# Patient Record
Sex: MALE | Race: OTHER | HISPANIC OR LATINO | URBAN - METROPOLITAN AREA
[De-identification: names, ages, dates, MRNs, and addresses within clinical notes are randomized per-mention and may not be internally consistent; named-entity substitution may affect disease eponyms.]

---

## 2023-02-27 ENCOUNTER — EMERGENCY (EMERGENCY)
Facility: HOSPITAL | Age: 73
LOS: 1 days | Discharge: HOME | End: 2023-02-27
Attending: EMERGENCY MEDICINE
Payer: MEDICAID

## 2023-02-27 VITALS
DIASTOLIC BLOOD PRESSURE: 72 MMHG | TEMPERATURE: 98 F | RESPIRATION RATE: 20 BRPM | SYSTOLIC BLOOD PRESSURE: 123 MMHG | HEART RATE: 58 BPM | OXYGEN SATURATION: 97 %

## 2023-02-27 VITALS
DIASTOLIC BLOOD PRESSURE: 76 MMHG | SYSTOLIC BLOOD PRESSURE: 137 MMHG | OXYGEN SATURATION: 97 % | WEIGHT: 145.06 LBS | RESPIRATION RATE: 20 BRPM | TEMPERATURE: 98 F | HEART RATE: 69 BPM

## 2023-02-27 LAB
ALBUMIN SERPL ELPH-MCNC: 2.8 G/DL — LOW (ref 3.5–5)
ALP SERPL-CCNC: 95 U/L — SIGNIFICANT CHANGE UP (ref 40–120)
ALT FLD-CCNC: 40 U/L DA — SIGNIFICANT CHANGE UP (ref 10–60)
ANION GAP SERPL CALC-SCNC: 3 MMOL/L — LOW (ref 5–17)
APPEARANCE UR: CLEAR — SIGNIFICANT CHANGE UP
APTT BLD: 32.2 SEC — SIGNIFICANT CHANGE UP (ref 27.5–35.5)
AST SERPL-CCNC: 35 U/L — SIGNIFICANT CHANGE UP (ref 10–40)
BACTERIA # UR AUTO: ABNORMAL /HPF
BASOPHILS # BLD AUTO: 0.03 K/UL — SIGNIFICANT CHANGE UP (ref 0–0.2)
BASOPHILS NFR BLD AUTO: 0.3 % — SIGNIFICANT CHANGE UP (ref 0–2)
BILIRUB SERPL-MCNC: 0.4 MG/DL — SIGNIFICANT CHANGE UP (ref 0.2–1.2)
BILIRUB UR-MCNC: NEGATIVE — SIGNIFICANT CHANGE UP
BUN SERPL-MCNC: 18 MG/DL — SIGNIFICANT CHANGE UP (ref 7–18)
CALCIUM SERPL-MCNC: 9.1 MG/DL — SIGNIFICANT CHANGE UP (ref 8.4–10.5)
CHLORIDE SERPL-SCNC: 106 MMOL/L — SIGNIFICANT CHANGE UP (ref 96–108)
CO2 SERPL-SCNC: 26 MMOL/L — SIGNIFICANT CHANGE UP (ref 22–31)
COLOR SPEC: YELLOW — SIGNIFICANT CHANGE UP
CREAT SERPL-MCNC: 0.7 MG/DL — SIGNIFICANT CHANGE UP (ref 0.5–1.3)
DIFF PNL FLD: NEGATIVE — SIGNIFICANT CHANGE UP
EGFR: 99 ML/MIN/1.73M2 — SIGNIFICANT CHANGE UP
EOSINOPHIL # BLD AUTO: 0.2 K/UL — SIGNIFICANT CHANGE UP (ref 0–0.5)
EOSINOPHIL NFR BLD AUTO: 2.3 % — SIGNIFICANT CHANGE UP (ref 0–6)
EPI CELLS # UR: ABNORMAL /HPF
FLUAV AG NPH QL: SIGNIFICANT CHANGE UP
FLUBV AG NPH QL: SIGNIFICANT CHANGE UP
GLUCOSE SERPL-MCNC: 89 MG/DL — SIGNIFICANT CHANGE UP (ref 70–99)
GLUCOSE UR QL: NEGATIVE — SIGNIFICANT CHANGE UP
HCT VFR BLD CALC: 41.9 % — SIGNIFICANT CHANGE UP (ref 39–50)
HGB BLD-MCNC: 14.1 G/DL — SIGNIFICANT CHANGE UP (ref 13–17)
IMM GRANULOCYTES NFR BLD AUTO: 1.7 % — HIGH (ref 0–0.9)
INR BLD: 1.02 RATIO — SIGNIFICANT CHANGE UP (ref 0.88–1.16)
KETONES UR-MCNC: NEGATIVE — SIGNIFICANT CHANGE UP
LACTATE SERPL-SCNC: 1.3 MMOL/L — SIGNIFICANT CHANGE UP (ref 0.7–2)
LEUKOCYTE ESTERASE UR-ACNC: NEGATIVE — SIGNIFICANT CHANGE UP
LYMPHOCYTES # BLD AUTO: 2.04 K/UL — SIGNIFICANT CHANGE UP (ref 1–3.3)
LYMPHOCYTES # BLD AUTO: 23.3 % — SIGNIFICANT CHANGE UP (ref 13–44)
MCHC RBC-ENTMCNC: 30.8 PG — SIGNIFICANT CHANGE UP (ref 27–34)
MCHC RBC-ENTMCNC: 33.7 GM/DL — SIGNIFICANT CHANGE UP (ref 32–36)
MCV RBC AUTO: 91.5 FL — SIGNIFICANT CHANGE UP (ref 80–100)
MONOCYTES # BLD AUTO: 0.72 K/UL — SIGNIFICANT CHANGE UP (ref 0–0.9)
MONOCYTES NFR BLD AUTO: 8.2 % — SIGNIFICANT CHANGE UP (ref 2–14)
NEUTROPHILS # BLD AUTO: 5.63 K/UL — SIGNIFICANT CHANGE UP (ref 1.8–7.4)
NEUTROPHILS NFR BLD AUTO: 64.2 % — SIGNIFICANT CHANGE UP (ref 43–77)
NITRITE UR-MCNC: NEGATIVE — SIGNIFICANT CHANGE UP
NRBC # BLD: 0 /100 WBCS — SIGNIFICANT CHANGE UP (ref 0–0)
PH UR: 6 — SIGNIFICANT CHANGE UP (ref 5–8)
PLATELET # BLD AUTO: 260 K/UL — SIGNIFICANT CHANGE UP (ref 150–400)
POTASSIUM SERPL-MCNC: 3.9 MMOL/L — SIGNIFICANT CHANGE UP (ref 3.5–5.3)
POTASSIUM SERPL-SCNC: 3.9 MMOL/L — SIGNIFICANT CHANGE UP (ref 3.5–5.3)
PROT SERPL-MCNC: 7.4 G/DL — SIGNIFICANT CHANGE UP (ref 6–8.3)
PROT UR-MCNC: 15
PROTHROM AB SERPL-ACNC: 12.2 SEC — SIGNIFICANT CHANGE UP (ref 10.5–13.4)
RBC # BLD: 4.58 M/UL — SIGNIFICANT CHANGE UP (ref 4.2–5.8)
RBC # FLD: 12.5 % — SIGNIFICANT CHANGE UP (ref 10.3–14.5)
RBC CASTS # UR COMP ASSIST: NEGATIVE /HPF — SIGNIFICANT CHANGE UP (ref 0–2)
SARS-COV-2 RNA SPEC QL NAA+PROBE: SIGNIFICANT CHANGE UP
SODIUM SERPL-SCNC: 135 MMOL/L — SIGNIFICANT CHANGE UP (ref 135–145)
SP GR SPEC: 1.02 — SIGNIFICANT CHANGE UP (ref 1.01–1.02)
UROBILINOGEN FLD QL: NEGATIVE — SIGNIFICANT CHANGE UP
WBC # BLD: 8.77 K/UL — SIGNIFICANT CHANGE UP (ref 3.8–10.5)
WBC # FLD AUTO: 8.77 K/UL — SIGNIFICANT CHANGE UP (ref 3.8–10.5)
WBC UR QL: SIGNIFICANT CHANGE UP /HPF (ref 0–5)

## 2023-02-27 PROCEDURE — 85610 PROTHROMBIN TIME: CPT

## 2023-02-27 PROCEDURE — 85730 THROMBOPLASTIN TIME PARTIAL: CPT

## 2023-02-27 PROCEDURE — 83605 ASSAY OF LACTIC ACID: CPT

## 2023-02-27 PROCEDURE — 87086 URINE CULTURE/COLONY COUNT: CPT

## 2023-02-27 PROCEDURE — 99284 EMERGENCY DEPT VISIT MOD MDM: CPT

## 2023-02-27 PROCEDURE — 85025 COMPLETE CBC W/AUTO DIFF WBC: CPT

## 2023-02-27 PROCEDURE — 87637 SARSCOV2&INF A&B&RSV AMP PRB: CPT

## 2023-02-27 PROCEDURE — 81001 URINALYSIS AUTO W/SCOPE: CPT

## 2023-02-27 PROCEDURE — 99285 EMERGENCY DEPT VISIT HI MDM: CPT | Mod: 25

## 2023-02-27 PROCEDURE — 71045 X-RAY EXAM CHEST 1 VIEW: CPT

## 2023-02-27 PROCEDURE — 80053 COMPREHEN METABOLIC PANEL: CPT

## 2023-02-27 PROCEDURE — 36415 COLL VENOUS BLD VENIPUNCTURE: CPT

## 2023-02-27 PROCEDURE — 87040 BLOOD CULTURE FOR BACTERIA: CPT

## 2023-02-27 PROCEDURE — 71045 X-RAY EXAM CHEST 1 VIEW: CPT | Mod: 26

## 2023-02-27 RX ORDER — AZITHROMYCIN 500 MG/1
500 TABLET, FILM COATED ORAL ONCE
Refills: 0 | Status: COMPLETED | OUTPATIENT
Start: 2023-02-27 | End: 2023-02-27

## 2023-02-27 RX ORDER — AZITHROMYCIN 500 MG/1
1 TABLET, FILM COATED ORAL
Qty: 4 | Refills: 0
Start: 2023-02-27 | End: 2023-03-02

## 2023-02-27 RX ADMIN — AZITHROMYCIN 500 MILLIGRAM(S): 500 TABLET, FILM COATED ORAL at 17:48

## 2023-02-27 NOTE — ED ADULT TRIAGE NOTE - CHIEF COMPLAINT QUOTE
cough for one week , pt is coughing up blood for the last tow days , as per toribiother in law , pt had positive TB test one week ago

## 2023-02-27 NOTE — ED PROVIDER NOTE - PATIENT PORTAL LINK FT
You can access the FollowMyHealth Patient Portal offered by  by registering at the following website: [email or web address]. By joining [employer]’s FollowMyHealth portal, you will also be able to view your health information using other applications (apps) compatible with our system.

## 2023-02-27 NOTE — ED PROVIDER NOTE - CLINICAL SUMMARY MEDICAL DECISION MAKING FREE TEXT BOX
72 year old male with cough and nose bleed. Symptoms suggest viral illness. Consideration for pneumonia and active TB (although unlikely). Labs, ekg, xray

## 2023-02-27 NOTE — ED PROVIDER NOTE - ENMT, MLM
Dry blood in the right nare. Airway patent. Mouth with normal mucosa. Throat has no vesicles, no oropharyngeal exudates and uvula is midline.

## 2023-02-27 NOTE — ED ADULT NURSE NOTE - OBJECTIVE STATEMENT
patient presents to ED with c/o cough with phlegm x2 weeks and noted to have blood in tissue when blows his nose. patient presents to ED with c/o cough with phlegm x2 weeks and reports has blood in tissue when blows his nose.

## 2023-02-27 NOTE — ED PROVIDER NOTE - OBJECTIVE STATEMENT
72 year old male with no PMHx is presenting for 1 week of fevers, chills, cough. Patient states that the symptoms started after getting soaked in the rain. He reports having intermittent right sided nose bleed since yesterday. He was tested positive PPD 2 weeks ago and was informed that he needed to take 6 months of meds. No night sweats, weight loss, or coughing of blood. No chest pain or shortness of breath.

## 2023-02-27 NOTE — ED PROVIDER NOTE - NSFOLLOWUPINSTRUCTIONS_ED_ALL_ED_FT
Bronquitis aguda en los adultos    Acute Bronchitis, Adult    A comparison between normal and swollen airways, or bronchi, in the lungs.   La bronquitis aguda es la inflamación repentina de las vías respiratorias principales (bronquios) que salen de la tráquea en los pulmones. La hinchazón hace que las vías respiratorias se reduzcan y produzcan más mucosidad de lo normal. [place] puede dificultar la respiración y provocar tos o respiración ruidosa (sibilancia).    La bronquitis aguda puede durar varias semanas. La tos puede durar más tiempo. Las alergias, el asma y la exposición al humo pueden empeorar la afección.      ¿Cuáles son las causas?    Esta afección puede ser causada por microbios y por sustancias que irritan los pulmones, por ejemplo:  •Virus del resfrío y de la gripe. La causa más frecuente de esta afección es el virus que provoca el resfrío común.      •Bacterias. [place] es menos frecuente.    •Aspirar sustancias que irritan los pulmones, por ejemplo:  •Humo de cigarrillos y otros productos de tabaco.      •Polvo y polen.      •Vapores de productos de limpieza domésticos, gases o combustible quemado.      •Contaminación del aire interior o exterior.          ¿Qué incrementa el riesgo?    Los siguientes factores pueden hacer que sea más propenso a desarrollar esta afección:  •Debilitamiento del sistema de defensa del organismo, también denominado sistema inmunitario.      •Cam afección que afecte a los pulmones y la respiración, rosalina el asma.        ¿Cuáles son los signos o síntomas?    Los síntomas frecuentes de esta afección incluyen los siguientes:  •Tos. Puede acompañarse de mucosidad transparente, amarillenta o verdosa procedente de los pulmones (esputo).      •Sibilancias.      •Secreción o congestión nasal.      •Exceso de mucosidad en los pulmones (congestión torácica).      •Falta de aire.      •Carmencita y molestias, incluido dolor de garganta o de pecho.        ¿Cómo se diagnostica?    Esta afección, usualmente, se diagnostica en función de lo siguiente:  •Los síntomas y los antecedentes médicos.      •Un examen físico.      También pueden realizarle otros estudios, incluidas pruebas para descartar otras afecciones, rosalina la neumonía. Estos estudios incluyen:  •Cam prueba de función pulmonar.      •Análisis de cam muestra de mucosidad para detectar la presencia de bacterias.      •Pruebas para controlar el nivel de oxígeno en la hung.      •Análisis de hung.      •Radiografía de tórax.        ¿Cómo se trata?    La mayoría de los casos de bronquitis aguda se recupera con el tiempo, sin tratamiento. El médico puede recomendarle lo siguiente:  •Beber más líquidos para ayudar a diluir la mucosidad de modo que sea más fácil expectorarla.      •Usar medicamentos inhalados (inhalador) para mejorar el flujo de aire que entra y sale de los pulmones.      •Usar un vaporizador o un humificador. Se trata de aparatos que aportan humedad al ambiente para ayudar a respirar mejor.      •Tameka un medicamento que diluye la mucosidad y wilbert la congestión (expectorante).      •Tameka un medicamento que previene o detiene la tos (antitusivo).      No esfrecuente tameka un antibiótico para esta afección.      Siga estas indicaciones en gaxiola casa:  A do not smoke cigarettes sign.   •Use los medicamentos de venta reyes y los recetados solamente rosalina se lo haya indicado el médico.      •Utilice un inhalador, un vaporizador o un humidificador, rosalina se lo haya indicado el médico.      •Los [place] dos cucharaditas (10 ml) de miel a la hora de acostarse para disminuir la tos por la noche.      •Meagan suficiente líquido rosalina para mantener la orina de color amarillo pálido.      • No consuma ningún producto que contenga nicotina o tabaco. Estos productos incluyen cigarrillos, tabaco para mascar y aparatos de vapeo, rosalina los cigarrillos electrónicos. Si necesita ayuda para dejar de consumir estos productos, consulte al médico.      •Descanse mucho.      •Retome lucía actividades normales según lo indicado por el médico. Pregúntele al médico qué actividades son seguras para usted.      •Concurra a todas las visitas de seguimiento. [place] es importante.        ¿Cómo se previene?  Washing hands with soap and water.   Para disminuir el riesgo de volver a sufrir esta afección:  •Lávese las alverto frecuentemente con agua y jabón aditya al menos 20 segundos. Use desinfectante para alverto si no dispone de agua y jabón.      •Evite el contacto con personas que tienen síntomas de resfrío.      •Trate de no llevarse las alverto a la boca, la nariz o los ojos.      •Evite inhalar humo o vapores químicos. La inhalación de humo o vapores químicos hará que gaxiola afección empeore.      •Aplíquese la vacuna antigripal todos los años.        Comuníquese con un médico si:    •Los síntomas no mejoran después de 2 semanas.      •Tiene dificultad para expulsar la mucosidad al toser.      •La tos lo mantiene despierto por la noche.      •Tiene fiebre.        Solicite ayuda de inmediato si:    •Tose con hung.      •Siente dolor en el pecho.      •Tiene falta de aire grave.      •Se desmaya o se siente rosalina si se fuera a desmayar.      •Tiene un dolor de angelina intenso.      •Tiene fiebre o escalofríos que empeoran.      Estos síntomas pueden representar un problema grave que constituye cam emergencia. No espere a clovis si los síntomas desaparecen. Solicite atención médica de inmediato. Comuníquese con el servicio de emergencias de gaxiola localidad (911 en los [place]). No conduzca por lucía propios medios hasta el hospital.       Resumen    •La bronquitis aguda es la inflamación de las vías respiratorias principales (bronquios) que salen de la tráquea en los pulmones. La hinchazón hace que las vías respiratorias se reduzcan y produzcan más mucosidad de lo normal.      •Beber más líquidos puede ayudar a diluir la mucosidad de modo que sea más fácil expectorarla.      •Use los medicamentos de venta reyes y los recetados solamente rosalina se lo haya indicado el médico.      • No consuma ningún producto que contenga nicotina o tabaco. Estos productos incluyen cigarrillos, tabaco para mascar y aparatos de vapeo, rosalina los cigarrillos electrónicos. Si necesita ayuda para dejar de consumir estos productos, consulte al médico.      •Comuníquese con un médico si los síntomas no mejoran después de 2 semanas.      Esta información no tiene rosalina fin reemplazar el consejo del médico. Asegúrese de hacerle al médico cualquier pregunta que tenga.

## 2023-02-28 LAB
CULTURE RESULTS: SIGNIFICANT CHANGE UP
SPECIMEN SOURCE: SIGNIFICANT CHANGE UP

## 2023-03-04 LAB
CULTURE RESULTS: SIGNIFICANT CHANGE UP
CULTURE RESULTS: SIGNIFICANT CHANGE UP
SPECIMEN SOURCE: SIGNIFICANT CHANGE UP
SPECIMEN SOURCE: SIGNIFICANT CHANGE UP

## 2024-08-29 ENCOUNTER — EMERGENCY (EMERGENCY)
Facility: HOSPITAL | Age: 74
LOS: 1 days | Discharge: HOME | End: 2024-08-29
Attending: STUDENT IN AN ORGANIZED HEALTH CARE EDUCATION/TRAINING PROGRAM
Payer: MEDICAID

## 2024-08-29 VITALS
HEIGHT: 64.17 IN | OXYGEN SATURATION: 98 % | RESPIRATION RATE: 20 BRPM | HEART RATE: 50 BPM | TEMPERATURE: 98 F | SYSTOLIC BLOOD PRESSURE: 109 MMHG | DIASTOLIC BLOOD PRESSURE: 70 MMHG | WEIGHT: 138.01 LBS

## 2024-08-29 VITALS
HEART RATE: 69 BPM | OXYGEN SATURATION: 99 % | DIASTOLIC BLOOD PRESSURE: 69 MMHG | TEMPERATURE: 98 F | RESPIRATION RATE: 18 BRPM | SYSTOLIC BLOOD PRESSURE: 137 MMHG

## 2024-08-29 PROBLEM — Z78.9 OTHER SPECIFIED HEALTH STATUS: Chronic | Status: ACTIVE | Noted: 2023-02-27

## 2024-08-29 LAB
ALBUMIN SERPL ELPH-MCNC: 3.4 G/DL — LOW (ref 3.5–5)
ALP SERPL-CCNC: 74 U/L — SIGNIFICANT CHANGE UP (ref 40–120)
ALT FLD-CCNC: 48 U/L DA — SIGNIFICANT CHANGE UP (ref 10–60)
ANION GAP SERPL CALC-SCNC: 4 MMOL/L — LOW (ref 5–17)
APPEARANCE UR: CLEAR — SIGNIFICANT CHANGE UP
AST SERPL-CCNC: 38 U/L — SIGNIFICANT CHANGE UP (ref 10–40)
BASOPHILS # BLD AUTO: 0.02 K/UL — SIGNIFICANT CHANGE UP (ref 0–0.2)
BASOPHILS NFR BLD AUTO: 0.4 % — SIGNIFICANT CHANGE UP (ref 0–2)
BILIRUB SERPL-MCNC: 0.9 MG/DL — SIGNIFICANT CHANGE UP (ref 0.2–1.2)
BILIRUB UR-MCNC: NEGATIVE — SIGNIFICANT CHANGE UP
BUN SERPL-MCNC: 14 MG/DL — SIGNIFICANT CHANGE UP (ref 7–18)
CALCIUM SERPL-MCNC: 9.1 MG/DL — SIGNIFICANT CHANGE UP (ref 8.4–10.5)
CHLORIDE SERPL-SCNC: 110 MMOL/L — HIGH (ref 96–108)
CO2 SERPL-SCNC: 26 MMOL/L — SIGNIFICANT CHANGE UP (ref 22–31)
COLOR SPEC: YELLOW — SIGNIFICANT CHANGE UP
CREAT SERPL-MCNC: 0.73 MG/DL — SIGNIFICANT CHANGE UP (ref 0.5–1.3)
DIFF PNL FLD: NEGATIVE — SIGNIFICANT CHANGE UP
EGFR: 96 ML/MIN/1.73M2 — SIGNIFICANT CHANGE UP
EOSINOPHIL # BLD AUTO: 0.14 K/UL — SIGNIFICANT CHANGE UP (ref 0–0.5)
EOSINOPHIL NFR BLD AUTO: 2.7 % — SIGNIFICANT CHANGE UP (ref 0–6)
GLUCOSE SERPL-MCNC: 95 MG/DL — SIGNIFICANT CHANGE UP (ref 70–99)
GLUCOSE UR QL: NEGATIVE MG/DL — SIGNIFICANT CHANGE UP
HCT VFR BLD CALC: 41.3 % — SIGNIFICANT CHANGE UP (ref 39–50)
HGB BLD-MCNC: 14.7 G/DL — SIGNIFICANT CHANGE UP (ref 13–17)
IMM GRANULOCYTES NFR BLD AUTO: 0.2 % — SIGNIFICANT CHANGE UP (ref 0–0.9)
KETONES UR-MCNC: NEGATIVE MG/DL — SIGNIFICANT CHANGE UP
LEUKOCYTE ESTERASE UR-ACNC: NEGATIVE — SIGNIFICANT CHANGE UP
LYMPHOCYTES # BLD AUTO: 2.11 K/UL — SIGNIFICANT CHANGE UP (ref 1–3.3)
LYMPHOCYTES # BLD AUTO: 40.3 % — SIGNIFICANT CHANGE UP (ref 13–44)
MCHC RBC-ENTMCNC: 32.3 PG — SIGNIFICANT CHANGE UP (ref 27–34)
MCHC RBC-ENTMCNC: 35.6 GM/DL — SIGNIFICANT CHANGE UP (ref 32–36)
MCV RBC AUTO: 90.8 FL — SIGNIFICANT CHANGE UP (ref 80–100)
MONOCYTES # BLD AUTO: 0.57 K/UL — SIGNIFICANT CHANGE UP (ref 0–0.9)
MONOCYTES NFR BLD AUTO: 10.9 % — SIGNIFICANT CHANGE UP (ref 2–14)
NEUTROPHILS # BLD AUTO: 2.38 K/UL — SIGNIFICANT CHANGE UP (ref 1.8–7.4)
NEUTROPHILS NFR BLD AUTO: 45.5 % — SIGNIFICANT CHANGE UP (ref 43–77)
NITRITE UR-MCNC: NEGATIVE — SIGNIFICANT CHANGE UP
NRBC # BLD: 0 /100 WBCS — SIGNIFICANT CHANGE UP (ref 0–0)
PH UR: 7 — SIGNIFICANT CHANGE UP (ref 5–8)
PLATELET # BLD AUTO: 150 K/UL — SIGNIFICANT CHANGE UP (ref 150–400)
POTASSIUM SERPL-MCNC: 3.9 MMOL/L — SIGNIFICANT CHANGE UP (ref 3.5–5.3)
POTASSIUM SERPL-SCNC: 3.9 MMOL/L — SIGNIFICANT CHANGE UP (ref 3.5–5.3)
PROT SERPL-MCNC: 6.9 G/DL — SIGNIFICANT CHANGE UP (ref 6–8.3)
PROT UR-MCNC: NEGATIVE MG/DL — SIGNIFICANT CHANGE UP
RBC # BLD: 4.55 M/UL — SIGNIFICANT CHANGE UP (ref 4.2–5.8)
RBC # FLD: 12.8 % — SIGNIFICANT CHANGE UP (ref 10.3–14.5)
SODIUM SERPL-SCNC: 140 MMOL/L — SIGNIFICANT CHANGE UP (ref 135–145)
SP GR SPEC: 1.01 — SIGNIFICANT CHANGE UP (ref 1–1.03)
UROBILINOGEN FLD QL: 0.2 MG/DL — SIGNIFICANT CHANGE UP (ref 0.2–1)
WBC # BLD: 5.23 K/UL — SIGNIFICANT CHANGE UP (ref 3.8–10.5)
WBC # FLD AUTO: 5.23 K/UL — SIGNIFICANT CHANGE UP (ref 3.8–10.5)

## 2024-08-29 PROCEDURE — 80053 COMPREHEN METABOLIC PANEL: CPT

## 2024-08-29 PROCEDURE — 99284 EMERGENCY DEPT VISIT MOD MDM: CPT | Mod: 25

## 2024-08-29 PROCEDURE — 81003 URINALYSIS AUTO W/O SCOPE: CPT

## 2024-08-29 PROCEDURE — 74177 CT ABD & PELVIS W/CONTRAST: CPT | Mod: MC

## 2024-08-29 PROCEDURE — 99285 EMERGENCY DEPT VISIT HI MDM: CPT

## 2024-08-29 PROCEDURE — 85025 COMPLETE CBC W/AUTO DIFF WBC: CPT

## 2024-08-29 PROCEDURE — 36415 COLL VENOUS BLD VENIPUNCTURE: CPT

## 2024-08-29 PROCEDURE — 76870 US EXAM SCROTUM: CPT | Mod: 26

## 2024-08-29 PROCEDURE — 74177 CT ABD & PELVIS W/CONTRAST: CPT | Mod: 26,MC

## 2024-08-29 PROCEDURE — 87491 CHLMYD TRACH DNA AMP PROBE: CPT

## 2024-08-29 PROCEDURE — 87591 N.GONORRHOEAE DNA AMP PROB: CPT

## 2024-08-29 PROCEDURE — 76870 US EXAM SCROTUM: CPT

## 2024-08-29 NOTE — ED PROVIDER NOTE - PATIENT PORTAL LINK FT
You can access the FollowMyHealth Patient Portal offered by [facility] by registering at the following website: [email or web address]. By joining [employer]’s FollowMyHealth portal, you will also be able to view your health information using other applications (apps) compatible with our system. You can access the FollowMyHealth Patient Portal offered by [facility] by registering at the following website: [email or web address]. By joining [employer]’s FollowMyHealth portal, you will also be able to view your health information using other applications (apps) compatible with our system.

## 2024-08-29 NOTE — ED PROVIDER NOTE - CLINICAL SUMMARY MEDICAL DECISION MAKING FREE TEXT BOX
74y male hx pre-DM presents for swelling in the inguinal region with mild burning sensation and associated dyschezia without rectal bleeding, abdominal pain, vomiting, or penile discharge. contrary to triage note, no testicular pain but question of swelling, more prominant at the mons is swollen L>R. ROS otherwise negative. no prior surgeries. clear cardiopulmonary exam, benign abdomen with screening us for possible ortchitis/epididymitis, labs, ua/urine gc/chlam and tx for likely prostatitis. nonactionable vital signs nonactionable.

## 2024-08-29 NOTE — ED PROVIDER NOTE - CARE PLAN
1 Principal Discharge DX:	Prostatitis   Principal Discharge DX:	Prostatitis  Secondary Diagnosis:	Hernia, inguinal

## 2024-08-29 NOTE — ED PROVIDER NOTE - PHYSICAL EXAMINATION
General: non-toxic, NAD  HEENT: NCAT, PERRL  Cardiac: RRR, no murmurs, 2+ peripheral pulses  Resp: CTAB  Abdomen/: soft, non-distended, bowel sounds present, no ttp, no rebound or guarding. no organomegaly. small, soft, mobile fullness at the level of the inguinal canal bilaterally likely related to lymphadenopathy. no expressed discharge from the penis. no testicular tenderness. bilaterally intact cremaster reflexes. chaperone max lazarus, do  Extremities: no peripheral edema, calf tenderness, or leg size discrepancies  Skin: no rashes  Neuro: AAOx4, 5+motor, sensation grossly intact  Psych: mood and affect appropriate

## 2024-08-29 NOTE — ED PROVIDER NOTE - HIV OFFER
· Follow up with your primary care in 2-5 days if symptoms have not improved, or you may return here.  · If you were referred to a specialist, please follow up with that specialty.  · If you were prescribed antibiotics, please take them to completion.  · If you were prescribed a narcotic or any medication with sedative effects, do not drive or operate heavy equipment or machinery while taking these medications.  · You must understand that you have received treatment at an Urgent Care facility only, and that you may be released before all of your medical problems are known or treated. Urgent Care facilities are not equipped to handle life threatening emergencies. It is recommended that you go to an Emergency Department for further evaluation of worsening or concerning symptoms, or possibly life threatening conditions as discussed.                                        If you  smoke, please stop smoking    Symptomatic treatment:    Alternate tylenol and motrin every 4-6 hours  salt water gargles  Cold-eeze helps to reduce the duration of sore throat symptoms  Cepachol helps to numb the discomfort  Chloroseptic spray  Nasal saline spray reduces inflammation and dryness  Warm face compresses as often as you can  Vicks vapor rub at night  Flonase OTC or Nasacort OTC  Simple foods like chicken noodle soup help  Pedialyte helps with dehydration if lacking appetite  Rest as much as you can      
Opt out

## 2024-08-29 NOTE — ED PROVIDER NOTE - NS ED ROS FT
Constitutional: no fevers, chills  HEENT: no HA, vision changes, rhinorrhea, sore throat  Cardiac: no chest pain, palpitations  Respiratory: no SOB, cough or hemoptysis  GI: no n/v/d, +abd pain, no bloody or dark stools  : no dysuria, frequency, or hematuria  MSK: no joint pain, neck pain or back pain  Skin: no rashes, jaundice, pruritis  Neuro: no numbness/tingling, weakness, unsteady gait  ROS otherwise neg except per MDM

## 2024-08-29 NOTE — ED PROVIDER NOTE - NSFOLLOWUPINSTRUCTIONS_ED_ALL_ED_FT
Le atendieron en Urgencias por prostatitis.     1) Avanzar en la actividad según se tolere.   2) Continúe con todos los medicamentos recetados anteriormente según las indicaciones.    3) Shreya un seguimiento con gaxiola médico de atención primaria en 24 a 48 horas; tome copias de lucía resultados.    4) Regresar al Departamento de Emergencias si los síntomas empeoran o persisten, y/o CUALQUIER SÍNTOMA NUEVO O PREOCUPANTE.    albin todo el ciclo de antibióticos.     shreya un seguimiento con gaxiola médico de atención primaria.     concertar cam anjali con un urólogo. cam clínica cercana se enumera a continuación.    You were seen in the Emergency Department for prostatitis.     1) Advance activity as tolerated.   2) Continue all previously prescribed medications as directed.    3) Follow up with your primary care physician in 24-48 hours - take copies of your results.    4) Return to the Emergency Department for worsening or persistent symptoms, and/or ANY NEW OR CONCERNING SYMPTOMS.    take the entire course of antibiotics.     follow up with your primary care doctor.     make an appointment with a urologist. a nearby clinic is listed below. Le atendieron en Urgencias por prostatitis.     1) Avanzar en la actividad según se tolere.   2) Continúe con todos los medicamentos recetados anteriormente según las indicaciones.    3) Shreya un seguimiento con gaxiola médico de atención primaria en 24 a 48 horas; tome copias de regla resultados.    4) Regresar al Departamento de Emergencias si los síntomas empeoran o persisten, y/o CUALQUIER SÍNTOMA NUEVO O PREOCUPANTE.    albin todo el ciclo de antibióticos.     shreya un seguimiento con gaxiola médico de atención primaria.     concertar cam anjali con un urólogo. cam clínica cercana se enumera a continuación.    You were seen in the Emergency Department for prostatitis.     1) Advance activity as tolerated.   2) Continue all previously prescribed medications as directed.    3) Follow up with your primary care physician in 24-48 hours - take copies of your results.    4) Return to the Emergency Department for worsening or persistent symptoms, and/or ANY NEW OR CONCERNING SYMPTOMS.    take the entire course of antibiotics.     follow up with your primary care doctor.     make an appointment with a urologist. a nearby clinic is listed below.    Hernia inguinal en los adultos  Inguinal Hernia, Adult    Cam hernia inguinal se produce cuando la grasa o los intestinos empujan a través de cam vickie débil de un músculo donde se unen la pierna y el abdomen inferior (zurdo). [place] crea un bulto. Sujata tipo de hernia también puede aparecer en:  El escroto, si es varón.  Los pliegues de la piel alrededor de la vagina, si es espinoza.  Existen tatum tipos de hernias inguinales:  Hernias que se pueden empujar hacia adentro del abdomen (son reducibles). Sujata tipo de hernias cornelius vez provoca dolor.  Hernias que no son reducibles (están encarceladas).  Hernias que no son reducibles y pierden la irrigación sanguínea (están estranguladas). Sujata tipo de hernia requiere cirugía de emergencia.  ¿Cuáles son las causas?  Esta afección se produce cuando tiene cam vickie débil en los músculos o en los tejidos de la zurdo. [place] se desarrolla con el tiempo. La hernia puede salirse por la vickie débil cuando ejerce un esfuerzo repentino sobre los músculos abdominales inferiores, por ejemplo, en los siguientes casos:  Levanta un objeto pesado.  Hace esfuerzos para defecar. El estreñimiento puede llevar a tener que hacer un gran esfuerzo.  Tos.  ¿Qué incrementa el riesgo?  Es más probable que esta afección se manifieste en:  Los hombres.  Las embarazadas.  Personas que:  Tienen sobrepeso.  Realizan trabajos que requieren permanecer estar de pie aditya largos períodos o levantar cargas pesadas.  Landaverde tenido cam hernia inguinal previamente.  Fuman o tienen cam enfermedad pulmonar. Estos factores pueden causar tos a eliecer plazo (crónica).  ¿Cuáles son los signos o síntomas?  Los síntomas pueden depender del tamaño de la hernia. Con frecuencia, cam pequeña hernia inguinal no tiene síntomas. Estos son algunos de los síntomas de cam hernia más devi:  Un bulto en la vickie de la zurdo. Sujata es fácil de clovis cuando se encuentra de pie. Podría no ser visible si se encuentra acostado.  Dolor o ardor en la zurdo. [place] podría empeorar cuando levanta un objeto, realiza un esfuerzo o tose.  Un dolor sordo o cam sensación de presión en la zurdo.  Un bulto inusual en el escroto, en los hombres.  Los síntomas de cam hernia inguinal estrangulada pueden incluir lo siguiente:  Un bulto en la zurdo que es muy doloroso y sensible al tacto.  Un bulto que se torna de color wiseman o púrpura.  Fiebre, náuseas y vómitos.  Imposibilidad de defecar o eliminar gases.  ¿Cómo se diagnostica?  Esta afección se diagnostica en función de regla síntomas, antecedentes médicos y de un examen físico. El médico puede examinar la vickie de la zurdo y pedirle que tosa.    ¿Cómo se trata?  El tratamiento depende del tamaño de la hernia y de si tiene síntomas o no. Si no tiene síntomas, el médico podrá indicarle que controle cuidadosamente la hernia y que concurra a las visitas de seguimiento. Si tiene síntomas o cam hernia devi, es posible que necesite cirugía para repararla.    Siga estas instrucciones en gaxiola casa:  Estilo de marco    Evite levantar objetos pesados.  Intente no estar de pie aditya mucho tiempo.  No consuma ningún producto que contenga nicotina o tabaco. Estos productos incluyen cigarrillos, tabaco para mascar y aparatos de vapeo, rosalina los cigarrillos electrónicos. Si necesita ayuda para dejar de fumar, consulte al médico.  Mantenga un peso saludable.  Prevención del estreñimiento    Es posible que tenga que albin estas medidas para prevenir o tratar el estreñimiento:  Beber suficiente líquido rosalina para mantener la orina de color amarillo pálido.  Usar medicamentos recetados o de venta reyes.  Consumir alimentos ricos en fibra, rosalina frijoles, cereales integrales, y frutas y verduras frescas.  Limitar el consumo de alimentos ricos en grasa y azúcares procesados, rosalina los alimentos fritos o dulces.  Instrucciones generales    Puede intentar colocar la hernia en gaxiola lugar ejerciendo sobre esta cam presión muy suave mientras está acostado. No intente forzar el bulto hacia adentro nuevamente si sujata no entra fácilmente.  Observe si la hernia cambia de forma, de color o de tamaño. Solicite ayuda de inmediato si observa algún cambio.  Use los medicamentos de venta reyes y los recetados solamente rosalina se lo haya indicado el médico.  Cumpla con todas las visitas de seguimiento. [place] es importante.  Comuníquese con un médico si:  Tiene fiebre o escalofríos.  Tiene nuevos síntomas.  Regla síntomas empeoran.  Solicite ayuda de inmediato si:  Tiene dolor en la zurdo que comienza repentinamente y empeora.  Tiene un bulto en la zurdo que tiene las siguientes características:  Se agranda de repente y no se encoge.  Se vuelve wiseman o púrpura y causa dolor al tacto.  Si es hombre y tiene un dolor repentino en el escroto o el tamaño de sujata cambia de repente.  No puede volver a colocar la hernia en gaxiola lugar al ejercer sobre esta cam presión muy suave mientras está acostado.  Tiene náuseas o vómitos que no desaparecen.  Tiene latidos cardíacos acelerados.  Tiene dificultad para defecar o eliminar gases.  Estos síntomas pueden representar un problema grave que constituye cam emergencia. No espere a clovis si los síntomas desaparecen. Solicite atención médica de inmediato. Comuníquese con el servicio de emergencias de gaxiola localidad (911 en los [place]).    Resumen  Cam hernia inguinal se produce cuando la grasa o los intestinos empujan a través de cam vickie débil de un músculo donde se unen la pierna y el abdomen inferior (zurdo).  Esta afección ocurre cuando tiene cam vickie débil en los músculos o en los tejidos de la zurdo.  Los síntomas pueden depender del tamaño de la hernia, y pueden incluir dolor o hinchazón de la zurdo. Con frecuencia, cam pequeña hernia inguinal no tiene síntomas.  Si no tiene síntomas, es posible que no necesite tratamiento. Si tiene síntomas o cam hernia devi, es posible que necesite cirugía para reparar la hernia.  Evite levantar objetos pesados. También intente no estar de pie aditya mucho tiempo.  Esta información no tiene rosalina fin reemplazar el consejo del médico. Asegúrese de hacerle al médico cualquier pregunta que tenga.    Document Revised: 10/18/2021 Document Reviewed: 09/03/2021  [employer] Patient Education © 2024 [employer]  [employer] logo  Terms and Conditions  Privacy Policy  Editorial Policy  All content on this site: Copyright © 2024 [employer], its licensors, and contributors. All rights are reserved, including those for text and data mining, AI training, and similar technologies. For all open access content, the Creative Commons licensing terms apply.  Cookies are used by this site. To decline or learn more, visit our Cookies p

## 2024-08-30 LAB
C TRACH RRNA SPEC QL NAA+PROBE: SIGNIFICANT CHANGE UP
N GONORRHOEA RRNA SPEC QL NAA+PROBE: SIGNIFICANT CHANGE UP

## 2024-09-09 ENCOUNTER — EMERGENCY (EMERGENCY)
Facility: HOSPITAL | Age: 74
LOS: 1 days | Discharge: HOME | End: 2024-09-09
Attending: EMERGENCY MEDICINE
Payer: MEDICAID

## 2024-09-09 VITALS
HEART RATE: 50 BPM | SYSTOLIC BLOOD PRESSURE: 133 MMHG | RESPIRATION RATE: 17 BRPM | OXYGEN SATURATION: 98 % | HEIGHT: 64.17 IN | WEIGHT: 139.99 LBS | TEMPERATURE: 98 F | DIASTOLIC BLOOD PRESSURE: 66 MMHG

## 2024-09-09 VITALS
SYSTOLIC BLOOD PRESSURE: 149 MMHG | RESPIRATION RATE: 18 BRPM | DIASTOLIC BLOOD PRESSURE: 72 MMHG | OXYGEN SATURATION: 100 % | HEART RATE: 58 BPM | TEMPERATURE: 98 F

## 2024-09-09 LAB
ALBUMIN SERPL ELPH-MCNC: 3.8 G/DL — SIGNIFICANT CHANGE UP (ref 3.5–5)
ALP SERPL-CCNC: 85 U/L — SIGNIFICANT CHANGE UP (ref 40–120)
ALT FLD-CCNC: 63 U/L DA — HIGH (ref 10–60)
ANION GAP SERPL CALC-SCNC: 3 MMOL/L — LOW (ref 5–17)
APPEARANCE UR: CLEAR — SIGNIFICANT CHANGE UP
AST SERPL-CCNC: 45 U/L — HIGH (ref 10–40)
BASOPHILS # BLD AUTO: 0.02 K/UL — SIGNIFICANT CHANGE UP (ref 0–0.2)
BASOPHILS NFR BLD AUTO: 0.4 % — SIGNIFICANT CHANGE UP (ref 0–2)
BILIRUB SERPL-MCNC: 1 MG/DL — SIGNIFICANT CHANGE UP (ref 0.2–1.2)
BILIRUB UR-MCNC: NEGATIVE — SIGNIFICANT CHANGE UP
BUN SERPL-MCNC: 16 MG/DL — SIGNIFICANT CHANGE UP (ref 7–18)
CALCIUM SERPL-MCNC: 9.1 MG/DL — SIGNIFICANT CHANGE UP (ref 8.4–10.5)
CHLORIDE SERPL-SCNC: 109 MMOL/L — HIGH (ref 96–108)
CO2 SERPL-SCNC: 27 MMOL/L — SIGNIFICANT CHANGE UP (ref 22–31)
COLOR SPEC: YELLOW — SIGNIFICANT CHANGE UP
CREAT SERPL-MCNC: 0.74 MG/DL — SIGNIFICANT CHANGE UP (ref 0.5–1.3)
DIFF PNL FLD: NEGATIVE — SIGNIFICANT CHANGE UP
EGFR: 96 ML/MIN/1.73M2 — SIGNIFICANT CHANGE UP
EOSINOPHIL # BLD AUTO: 0.09 K/UL — SIGNIFICANT CHANGE UP (ref 0–0.5)
EOSINOPHIL NFR BLD AUTO: 1.9 % — SIGNIFICANT CHANGE UP (ref 0–6)
GLUCOSE SERPL-MCNC: 99 MG/DL — SIGNIFICANT CHANGE UP (ref 70–99)
GLUCOSE UR QL: NEGATIVE MG/DL — SIGNIFICANT CHANGE UP
HCT VFR BLD CALC: 44.5 % — SIGNIFICANT CHANGE UP (ref 39–50)
HGB BLD-MCNC: 15.7 G/DL — SIGNIFICANT CHANGE UP (ref 13–17)
IMM GRANULOCYTES NFR BLD AUTO: 0.2 % — SIGNIFICANT CHANGE UP (ref 0–0.9)
KETONES UR-MCNC: NEGATIVE MG/DL — SIGNIFICANT CHANGE UP
LACTATE SERPL-SCNC: 1.9 MMOL/L — SIGNIFICANT CHANGE UP (ref 0.7–2)
LEUKOCYTE ESTERASE UR-ACNC: NEGATIVE — SIGNIFICANT CHANGE UP
LIDOCAIN IGE QN: 49 U/L — SIGNIFICANT CHANGE UP (ref 13–75)
LYMPHOCYTES # BLD AUTO: 1.58 K/UL — SIGNIFICANT CHANGE UP (ref 1–3.3)
LYMPHOCYTES # BLD AUTO: 32.8 % — SIGNIFICANT CHANGE UP (ref 13–44)
MCHC RBC-ENTMCNC: 31.8 PG — SIGNIFICANT CHANGE UP (ref 27–34)
MCHC RBC-ENTMCNC: 35.3 GM/DL — SIGNIFICANT CHANGE UP (ref 32–36)
MCV RBC AUTO: 90.3 FL — SIGNIFICANT CHANGE UP (ref 80–100)
MONOCYTES # BLD AUTO: 0.34 K/UL — SIGNIFICANT CHANGE UP (ref 0–0.9)
MONOCYTES NFR BLD AUTO: 7.1 % — SIGNIFICANT CHANGE UP (ref 2–14)
NEUTROPHILS # BLD AUTO: 2.77 K/UL — SIGNIFICANT CHANGE UP (ref 1.8–7.4)
NEUTROPHILS NFR BLD AUTO: 57.6 % — SIGNIFICANT CHANGE UP (ref 43–77)
NITRITE UR-MCNC: NEGATIVE — SIGNIFICANT CHANGE UP
NRBC # BLD: 0 /100 WBCS — SIGNIFICANT CHANGE UP (ref 0–0)
PH UR: 5.5 — SIGNIFICANT CHANGE UP (ref 5–8)
PLATELET # BLD AUTO: 170 K/UL — SIGNIFICANT CHANGE UP (ref 150–400)
POTASSIUM SERPL-MCNC: 3.8 MMOL/L — SIGNIFICANT CHANGE UP (ref 3.5–5.3)
POTASSIUM SERPL-SCNC: 3.8 MMOL/L — SIGNIFICANT CHANGE UP (ref 3.5–5.3)
PROT SERPL-MCNC: 7.2 G/DL — SIGNIFICANT CHANGE UP (ref 6–8.3)
PROT UR-MCNC: NEGATIVE MG/DL — SIGNIFICANT CHANGE UP
RBC # BLD: 4.93 M/UL — SIGNIFICANT CHANGE UP (ref 4.2–5.8)
RBC # FLD: 12.9 % — SIGNIFICANT CHANGE UP (ref 10.3–14.5)
SODIUM SERPL-SCNC: 139 MMOL/L — SIGNIFICANT CHANGE UP (ref 135–145)
SP GR SPEC: 1.01 — SIGNIFICANT CHANGE UP (ref 1–1.03)
UROBILINOGEN FLD QL: 0.2 MG/DL — SIGNIFICANT CHANGE UP (ref 0.2–1)
WBC # BLD: 4.81 K/UL — SIGNIFICANT CHANGE UP (ref 3.8–10.5)
WBC # FLD AUTO: 4.81 K/UL — SIGNIFICANT CHANGE UP (ref 3.8–10.5)

## 2024-09-09 PROCEDURE — 99284 EMERGENCY DEPT VISIT MOD MDM: CPT | Mod: 25

## 2024-09-09 PROCEDURE — 83605 ASSAY OF LACTIC ACID: CPT

## 2024-09-09 PROCEDURE — 74177 CT ABD & PELVIS W/CONTRAST: CPT | Mod: 26,MC

## 2024-09-09 PROCEDURE — 87086 URINE CULTURE/COLONY COUNT: CPT

## 2024-09-09 PROCEDURE — 83690 ASSAY OF LIPASE: CPT

## 2024-09-09 PROCEDURE — 99285 EMERGENCY DEPT VISIT HI MDM: CPT

## 2024-09-09 PROCEDURE — 81003 URINALYSIS AUTO W/O SCOPE: CPT

## 2024-09-09 PROCEDURE — 85025 COMPLETE CBC W/AUTO DIFF WBC: CPT

## 2024-09-09 PROCEDURE — T1013: CPT

## 2024-09-09 PROCEDURE — 80053 COMPREHEN METABOLIC PANEL: CPT

## 2024-09-09 PROCEDURE — 36415 COLL VENOUS BLD VENIPUNCTURE: CPT

## 2024-09-09 PROCEDURE — 74177 CT ABD & PELVIS W/CONTRAST: CPT | Mod: MC

## 2024-09-09 RX ORDER — IBUPROFEN 600 MG
1 TABLET ORAL
Qty: 28 | Refills: 0
Start: 2024-09-09 | End: 2024-09-15

## 2024-09-09 NOTE — CONSULT NOTE ADULT - ASSESSMENT
74y.o. Male with reducible LIH    -Outpt f/u with Dr. Salvador 9/12/24 at 1700  -No acute surgical intervention indicated at present time  -No heavy lifting, straining, exercises  -Diet as tolerated

## 2024-09-09 NOTE — ED PROVIDER NOTE - PROGRESS NOTE DETAILS
[place]: CT with bilateral inguinal hernias and on the left side with nonobstructed small bowel.  Surgery was consulted and evaluated the patient.  As per surgery hernia is reducible and patient is going to follow-up with general surgery this week for evaluation and planning for surgery.  Will discharge with pain medications.  Follow-up with general surgery.  Return precautions discussed.

## 2024-09-09 NOTE — ED PROVIDER NOTE - PHYSICAL EXAMINATION
Gen. No acute distress  HEENT: Pharynx is clear  Lungs: Bilateral breath sounds  CVS: S1S2   Abd; no guarding no distention no tenderness, no CVA tenderness  Ext: No edema no erythema  Neuro: Alert oriented x 3 no focal deficits  MSK: Strength 5 5 bilateral upper and lower extremities

## 2024-09-09 NOTE — ED PROVIDER NOTE - PATIENT PORTAL LINK FT
You can access the FollowMyHealth Patient Portal offered by [facility] by registering at the following website: [email or web address]. By joining [employer]’s FollowMyHealth portal, you will also be able to view your health information using other applications (apps) compatible with our system.

## 2024-09-09 NOTE — ED PROVIDER NOTE - NSFOLLOWUPCLINICS_GEN_ALL_ED_FT
[facility]  Surgery  [address] [place] [address]  Phone: [phone]  Fax: [phone]  Scheduled Appointment: 9/12/2024 5:00 PM

## 2024-09-09 NOTE — CONSULT NOTE ADULT - SUBJECTIVE AND OBJECTIVE BOX
Patient is a 74y old  Male who presents with a chief complaint of     HPI:  Called see and eval 74y.o. Male w/PMH significant for pre-DM, L inguinal hernia for L groin pain. Pt states he has been feeling groin pain for about 8 days. Does not recall what he was doing when pain started, but states he feels the pain intermittently, especially when he voids and defecates. Eating well, +flatus/BM. Pt has appointment with Dr. Salvador for 24 for surgical evaluation.     PAST MEDICAL & SURGICAL HISTORY:  No pertinent past medical history    No significant past surgical history    Allergies    No Known Allergies    Intolerances    Vital Signs Last 24 Hrs  T(C): 36.7 (09 Sep 2024 15:48), Max: 37 (09 Sep 2024 11:40)  T(F): 98 (09 Sep 2024 15:48), Max: 98.6 (09 Sep 2024 11:40)  HR: 48 (09 Sep 2024 15:48) (48 - 50)  BP: 159/78 (09 Sep 2024 15:48) (118/70 - 159/78)  BP(mean): --  RR: 20 (09 Sep 2024 15:48) (17 - 20)  SpO2: 100% (09 Sep 2024 15:48) (98% - 100%)    Parameters below as of 09 Sep 2024 15:48  Patient On (Oxygen Delivery Method): room air    Physical:  Gen: A&Ox3. NAD  Abd: Soft ND, NT. No swelling/bulge groin  Pelvis: Soft scrotum, palpable testicles b/l, nontender    LABS:                        15.7   4.81  )-----------( 170      ( 09 Sep 2024 11:10 )             44.5                  139  |  109<H>  |  16  ----------------------------<  99  3.8   |  27  |  0.74    Ca    9.1      09 Sep 2024 11:10    TPro  7.2  /  Alb  3.8  /  TBili  1.0  /  DBili  x   /  AST  45<H>  /  ALT  63<H>  /  AlkPhos  85                Urinalysis Basic - ( 09 Sep 2024 11:37 )    Color: Yellow / Appearance: Clear / S.015 / pH: x  Gluc: x / Ketone: Negative mg/dL  / Bili: Negative / Urobili: 0.2 mg/dL   Blood: x / Protein: Negative mg/dL / Nitrite: Negative   Leuk Esterase: Negative / RBC: x / WBC x   Sq Epi: x / Non Sq Epi: x / Bacteria: x    RADIOLOGY & ADDITIONAL STUDIES:  < from: CT Abdomen and Pelvis w/ IV Cont (24 @ 15:39) >  IMPRESSION: No CT evidence for gallstones, thickened gallbladder wall or   pericholecystic fluid. Largely distended gallbladder. A few indeterminate   peripelvic hypodense lesions in the left kidney measuring up to 2.0 cm.   Abdominal ultrasound may be pursued for further evaluation.    No bowel obstruction or grossly thickened bowel wall. Appendix within   normal limits. Moderate amount of stool in the ascending and transverse   colon. Mild left inguinal hernia which contains nonobstructed small bowel   and small fluid.    < end of copied text >

## 2024-09-09 NOTE — ED PROVIDER NOTE - OBJECTIVE STATEMENT
74-year-old male with PMHx of pre-DM presents emergency department for increased abdominal distention and pain.  He is here with his son at the bedside.  He states he has been having difficulty urinating since his visit here 1 week ago.  There is no associated fever or chills.  There is no hematuria.  He says he has a lot of pain with urination.  Last episode was last night.  No nausea vomiting or diarrhea.  No recent trauma.  No genital lesions.

## 2024-09-09 NOTE — ED PROVIDER NOTE - NSFOLLOWUPINSTRUCTIONS_ED_ALL_ED_FT
Hernia inguinal en los adultos  Inguinal Hernia, Adult    Cam hernia inguinal se produce cuando la grasa o los intestinos empujan a través de cam vickie débil de un músculo donde se unen la pierna y el abdomen inferior (zurdo). Weir crea un bulto. Sujata tipo de hernia también puede aparecer en:  El escroto, si es varón.  Los pliegues de la piel alrededor de la vagina, si es espinoza.  Existen tatum tipos de hernias inguinales:  Hernias que se pueden empujar hacia adentro del abdomen (son reducibles). Sujata tipo de hernias cornelius vez provoca dolor.  Hernias que no son reducibles (están encarceladas).  Hernias que no son reducibles y pierden la irrigación sanguínea (están estranguladas). Sujata tipo de hernia requiere cirugía de emergencia.  ¿Cuáles son las causas?  Esta afección se produce cuando tiene cam vickie débil en los músculos o en los tejidos de la zurdo. Weir se desarrolla con el tiempo. La hernia puede salirse por la vickie débil cuando ejerce un esfuerzo repentino sobre los músculos abdominales inferiores, por ejemplo, en los siguientes casos:  Levanta un objeto pesado.  Hace esfuerzos para defecar. El estreñimiento puede llevar a tener que hacer un gran esfuerzo.  Tos.  ¿Qué incrementa el riesgo?  Es más probable que esta afección se manifieste en:  Los hombres.  Las embarazadas.  Personas que:  Tienen sobrepeso.  Realizan trabajos que requieren permanecer estar de pie aditya largos períodos o levantar cargas pesadas.  Landaverde tenido cam hernia inguinal previamente.  Fuman o tienen cam enfermedad pulmonar. Estos factores pueden causar tos a eliecer plazo (crónica).  ¿Cuáles son los signos o síntomas?  Los síntomas pueden depender del tamaño de la hernia. Con frecuencia, cam pequeña hernia inguinal no tiene síntomas. Estos son algunos de los síntomas de cam hernia más devi:  Un bulto en la vikcie de la zurdo. Sujata es fácil de clovis cuando se encuentra de pie. Podría no ser visible si se encuentra acostado.  Dolor o ardor en la zurdo. Weir podría empeorar cuando levanta un objeto, realiza un esfuerzo o tose.  Un dolor sordo o cam sensación de presión en la zurdo.  Un bulto inusual en el escroto, en los hombres.  Los síntomas de cam hernia inguinal estrangulada pueden incluir lo siguiente:  Un bulto en la zurdo que es muy doloroso y sensible al tacto.  Un bulto que se torna de color wiseman o púrpura.  Fiebre, náuseas y vómitos.  Imposibilidad de defecar o eliminar gases.  ¿Cómo se diagnostica?  Esta afección se diagnostica en función de regla síntomas, antecedentes médicos y de un examen físico. El médico puede examinar la vickie de la zurdo y pedirle que tosa.    ¿Cómo se trata?  El tratamiento depende del tamaño de la hernia y de si tiene síntomas o no. Si no tiene síntomas, el médico podrá indicarle que controle cuidadosamente la hernia y que concurra a las visitas de seguimiento. Si tiene síntomas o cam hernia devi, es posible que necesite cirugía para repararla.    Siga estas instrucciones en gaxiola casa:  Estilo de marco    Evite levantar objetos pesados.  Intente no estar de pie aditya mucho tiempo.  No consuma ningún producto que contenga nicotina o tabaco. Estos productos incluyen cigarrillos, tabaco para mascar y aparatos de vapeo, rosalina los cigarrillos electrónicos. Si necesita ayuda para dejar de fumar, consulte al médico.  Mantenga un peso saludable.  Prevención del estreñimiento    Es posible que tenga que albin estas medidas para prevenir o tratar el estreñimiento:  Beber suficiente líquido rosalina para mantener la orina de color amarillo pálido.  Usar medicamentos recetados o de venta reyes.  Consumir alimentos ricos en fibra, rosalina frijoles, cereales integrales, y frutas y verduras frescas.  Limitar el consumo de alimentos ricos en grasa y azúcares procesados, rosalina los alimentos fritos o dulces.  Instrucciones generales    Puede intentar colocar la hernia en gaxiola lugar ejerciendo sobre esta cam presión muy suave mientras está acostado. No intente forzar el bulto hacia adentro nuevamente si sujata no entra fácilmente.  Observe si la hernia cambia de forma, de color o de tamaño. Solicite ayuda de inmediato si observa algún cambio.  Use los medicamentos de venta reyes y los recetados solamente rosalina se lo haya indicado el médico.  Cumpla con todas las visitas de seguimiento. Weir es importante.  Comuníquese con un médico si:  Tiene fiebre o escalofríos.  Tiene nuevos síntomas.  Regla síntomas empeoran.  Solicite ayuda de inmediato si:  Tiene dolor en la zurdo que comienza repentinamente y empeora.  Tiene un bulto en la zurdo que tiene las siguientes características:  Se agranda de repente y no se encoge.  Se vuelve wiseman o púrpura y causa dolor al tacto.  Si es hombre y tiene un dolor repentino en el escroto o el tamaño de sujata cambia de repente.  No puede volver a colocar la hernia en gaxiola lugar al ejercer sobre esta cam presión muy suave mientras está acostado.  Tiene náuseas o vómitos que no desaparecen.  Tiene latidos cardíacos acelerados.  Tiene dificultad para defecar o eliminar gases.  Estos síntomas pueden representar un problema grave que constituye cam emergencia. No espere a clovis si los síntomas desaparecen. Solicite atención médica de inmediato. Comuníquese con el servicio de emergencias de gaxiola localidad (911 en los [place]).    Resumen  Cam hernia inguinal se produce cuando la grasa o los intestinos empujan a través de cam vickie débil de un músculo donde se unen la pierna y el abdomen inferior (zurdo).  Esta afección ocurre cuando tiene cam vickie débil en los músculos o en los tejidos de la zurdo.  Los síntomas pueden depender del tamaño de la hernia, y pueden incluir dolor o hinchazón de la zurdo. Con frecuencia, cam pequeña hernia inguinal no tiene síntomas.  Si no tiene síntomas, es posible que no necesite tratamiento. Si tiene síntomas o cam hernia devi, es posible que necesite cirugía para reparar la hernia.  Evite levantar objetos pesados. También intente no estar de pie aditya mucho tiempo.  Esta información no tiene rosalina fin reemplazar el consejo del médico. Asegúrese de hacerle al médico cualquier pregunta que tenga.    Document Revised: 10/18/2021 Document Reviewed: 09/03/2021  Elsevier Patient Education © 2024 Elsevier Inc.  Elsevier logo  Terms and Conditions  Privacy Policy  Editorial Policy  All content on this site: Copyright © 2024 Elsevier, its licensors, and contributors. All rights are reserved, including those for text and data mining, AI training, and similar technologies. For all open access content, the Creative Commons licensing terms apply.  Cookies are used by this site. To decline or learn more, visit our Cookies page.

## 2024-09-09 NOTE — ED PROVIDER NOTE - CLINICAL SUMMARY MEDICAL DECISION MAKING FREE TEXT BOX
ATTG: : Assessment/plan: Dysuria and abdominal discomfort, review prior records which included a CT, check labs, check bladder scan, check urinalysis, pain medication, reevaluate dispo

## 2024-09-09 NOTE — ED ADULT NURSE NOTE - NS ED NURSE DC PT EDUCATION RESOURCES
PATIENT VERIFIED BY DATE OF BIRTH AND NAME. Patient has been consented for this telecommunication visit.
corona jeter
60 yo male presents for f/u ileal Crohn's (since at least 2010). In summer 2022 he DC'd Humira due to a lot of skin issues and psoriasis. He started on Stelara in October 2022. 
corona jeter
When last seen in March, he felt a need for a repeat course of Xifaxan which was helpful again - he is pretty good about knowing his symptoms and when this helps, so asks for it as needed.corona jeter
We updated a fecal calpro at that time, which was high, and then f/u Stelara drug levels were low, so as of June he has been on q 4 week dosing. corona jeter He still has 6-8 BMs per day. Has had some improvement in stool form with increased Stelara dosing. He reports occasional crampy pains but mild overall, and also improved since changing Stelara dose. He uses Lomotil occasionally which does help him a great deal, but has not tended to take very often. corona jeter He would like to try a script for VSL #3 or Visbiome - has tried these before and did feel some benefit. He is wanting the capsules. corona jeter He has also been put on Dupixent for his rash, as of early March - he is seeing dermatology at [facility]. He reports doing quite well on this now - skin is much better. Last colonoscopy was in 2017 with 10-year recall advised. Ileal and colon biopsies were essentially negative. He has fatty liver and h/o Hep C, s/p cure. Has h/o stage 3 fibrosis. Continuing to monitor with us. He had updated U/S and Fibroscan this year.ROS as above, otherwise negative.corona  Yes

## 2024-09-09 NOTE — ED PROVIDER NOTE - NS ED ROS FT
Constitutional: no fever no chills  Eyes: no conjunctivitis  Ears: no ear pain no tinnitus  Nose: no nasal congestion, Mouth/Throat: no throat pain, Neck: no stiffness  Cardiovascular: no chest pain  Respiratory: no shortness of breath no cough  Gastrointestinal: see hpi  MSK: no joint pain  : +  dysuria  Skin: no rash  Neuro: no LOC no seizures

## 2024-09-10 LAB
CULTURE RESULTS: NO GROWTH — SIGNIFICANT CHANGE UP
SPECIMEN SOURCE: SIGNIFICANT CHANGE UP

## 2024-09-11 ENCOUNTER — NON-APPOINTMENT (OUTPATIENT)
Age: 74
End: 2024-09-11

## 2024-09-12 ENCOUNTER — APPOINTMENT (OUTPATIENT)
Dept: SURGERY | Facility: CLINIC | Age: 74
End: 2024-09-12
Payer: MEDICAID

## 2024-09-12 VITALS — DIASTOLIC BLOOD PRESSURE: 71 MMHG | OXYGEN SATURATION: 98 % | HEART RATE: 54 BPM | SYSTOLIC BLOOD PRESSURE: 115 MMHG

## 2024-09-12 DIAGNOSIS — Z78.9 OTHER SPECIFIED HEALTH STATUS: ICD-10-CM

## 2024-09-12 DIAGNOSIS — Z87.19 PERSONAL HISTORY OF OTHER DISEASES OF THE DIGESTIVE SYSTEM: ICD-10-CM

## 2024-09-12 DIAGNOSIS — K40.90 UNILATERAL INGUINAL HERNIA, W/OUT OBSTRUCTION OR GANGRENE, NOT SPECIFIED AS RECURRENT: ICD-10-CM

## 2024-09-12 PROBLEM — Z00.00 ENCOUNTER FOR PREVENTIVE HEALTH EXAMINATION: Status: ACTIVE | Noted: 2024-09-12

## 2024-09-12 PROCEDURE — 99203 OFFICE O/P NEW LOW 30 MIN: CPT

## 2024-09-12 RX ORDER — CIPROFLOXACIN HYDROCHLORIDE 500 MG/1
500 TABLET, FILM COATED ORAL
Refills: 0 | Status: ACTIVE | COMMUNITY

## 2024-09-23 ENCOUNTER — OUTPATIENT (OUTPATIENT)
Dept: OUTPATIENT SERVICES | Facility: HOSPITAL | Age: 74
LOS: 1 days | End: 2024-09-23
Payer: MEDICAID

## 2024-09-23 VITALS
WEIGHT: 138.01 LBS | HEART RATE: 61 BPM | SYSTOLIC BLOOD PRESSURE: 143 MMHG | HEIGHT: 63 IN | DIASTOLIC BLOOD PRESSURE: 80 MMHG | OXYGEN SATURATION: 97 % | TEMPERATURE: 98 F | RESPIRATION RATE: 16 BRPM

## 2024-09-23 DIAGNOSIS — K40.90 UNILATERAL INGUINAL HERNIA, WITHOUT OBSTRUCTION OR GANGRENE, NOT SPECIFIED AS RECURRENT: ICD-10-CM

## 2024-09-23 DIAGNOSIS — Z01.818 ENCOUNTER FOR OTHER PREPROCEDURAL EXAMINATION: ICD-10-CM

## 2024-09-23 DIAGNOSIS — N41.9 INFLAMMATORY DISEASE OF PROSTATE, UNSPECIFIED: ICD-10-CM

## 2024-09-23 RX ORDER — SODIUM CHLORIDE 0.9 % (FLUSH) 0.9 %
3 SYRINGE (ML) INJECTION EVERY 8 HOURS
Refills: 0 | Status: DISCONTINUED | OUTPATIENT
Start: 2024-10-01 | End: 2024-10-15

## 2024-09-23 NOTE — H&P PST ADULT - HISTORY OF PRESENT ILLNESS
74year old male with pmhx of prostatitis (currently taking antibiotics) presents with c/o left groin bulging mass with associated burning sensation 2months ago when he presented to the ED, had diagnostic studies done revealing left inguinal hernia repair. Patient is here today for presurgical testing for scheduled left inguinal hernia repair with mesh on 10/1/2024

## 2024-09-23 NOTE — H&P PST ADULT - PROBLEM SELECTOR PLAN 1
Patient is scheduled for left inguinal hernia repair with mesh on 10/1/2024  Written and oral preoperative instructions given to patient with understanding verbalized.    Instructions given to include using 4% chlorhexidine wash as directed starting 3days before day of surgery (inclusive of day of surgery)   Maintaining NPO status post midnight day before surgery   Stopping aspirin, NSAIDs, herbs, vitamins 7days before surgery    Patient is to expect a phone call day before surgery between 430-630pm, giving arrival time for surgery day.     Patient today with STOP Bang Score of 2, Low Risk for PHILIP

## 2024-09-23 NOTE — H&P PST ADULT - PROBLEM SELECTOR PLAN 2
Current treatment regimen - Azithromycin and Ciprofloxacin    Advised to continue current regimen   Follow up with Urologist postoperatively

## 2024-09-24 PROCEDURE — G0463: CPT

## 2024-09-24 PROCEDURE — T1013: CPT

## 2024-10-01 ENCOUNTER — TRANSCRIPTION ENCOUNTER (OUTPATIENT)
Age: 74
End: 2024-10-01

## 2024-10-01 ENCOUNTER — OUTPATIENT (OUTPATIENT)
Dept: OUTPATIENT SERVICES | Facility: HOSPITAL | Age: 74
LOS: 1 days | End: 2024-10-01
Payer: MEDICAID

## 2024-10-01 ENCOUNTER — APPOINTMENT (OUTPATIENT)
Dept: SURGERY | Facility: HOSPITAL | Age: 74
End: 2024-10-01

## 2024-10-01 VITALS
RESPIRATION RATE: 14 BRPM | HEART RATE: 60 BPM | OXYGEN SATURATION: 100 % | TEMPERATURE: 98 F | DIASTOLIC BLOOD PRESSURE: 61 MMHG | SYSTOLIC BLOOD PRESSURE: 129 MMHG

## 2024-10-01 VITALS
HEART RATE: 60 BPM | SYSTOLIC BLOOD PRESSURE: 135 MMHG | WEIGHT: 138.01 LBS | RESPIRATION RATE: 16 BRPM | OXYGEN SATURATION: 97 % | DIASTOLIC BLOOD PRESSURE: 72 MMHG | TEMPERATURE: 98 F | HEIGHT: 63 IN

## 2024-10-01 DIAGNOSIS — K40.90 UNILATERAL INGUINAL HERNIA, WITHOUT OBSTRUCTION OR GANGRENE, NOT SPECIFIED AS RECURRENT: ICD-10-CM

## 2024-10-01 DIAGNOSIS — Z01.818 ENCOUNTER FOR OTHER PREPROCEDURAL EXAMINATION: ICD-10-CM

## 2024-10-01 PROCEDURE — 49505 PRP I/HERN INIT REDUC >5 YR: CPT | Mod: LT

## 2024-10-01 PROCEDURE — C1781: CPT

## 2024-10-01 PROCEDURE — 49505 PRP I/HERN INIT REDUC >5 YR: CPT | Mod: AS,LT

## 2024-10-01 DEVICE — MESH HERNIA MARLEX 3 X 6": Type: IMPLANTABLE DEVICE | Site: LEFT INGUINAL HERNIA REPAIR WITH MESH | Status: FUNCTIONAL

## 2024-10-01 RX ORDER — ACETAMINOPHEN 325 MG
1000 TABLET ORAL ONCE
Refills: 0 | Status: DISCONTINUED | OUTPATIENT
Start: 2024-10-01 | End: 2024-10-01

## 2024-10-01 RX ORDER — HYDROMORPHONE HYDROCHLORIDE 1 MG/ML
0.5 INJECTION, SOLUTION INTRAMUSCULAR; INTRAVENOUS; SUBCUTANEOUS
Refills: 0 | Status: DISCONTINUED | OUTPATIENT
Start: 2024-10-01 | End: 2024-10-01

## 2024-10-01 RX ORDER — OXYCODONE HYDROCHLORIDE 30 MG/1
1 TABLET, FILM COATED, EXTENDED RELEASE ORAL
Qty: 4 | Refills: 0
Start: 2024-10-01

## 2024-10-01 RX ORDER — FENTANYL CITRATE-0.9 % NACL/PF 300MCG/30
25 PATIENT CONTROLLED ANALGESIA VIAL INJECTION
Refills: 0 | Status: DISCONTINUED | OUTPATIENT
Start: 2024-10-01 | End: 2024-10-01

## 2024-10-01 RX ADMIN — Medication 3 MILLILITER(S): at 10:32

## 2024-10-01 NOTE — ASU DISCHARGE PLAN (ADULT/PEDIATRIC) - SIGNS AND SYMPTOMS OF INFECTION: FEVER, REDNESS, SWELLING, FOUL SMELLING DISCHARGE
Panel Management Review      Patient has the following on his problem list:     Diabetes    ASA: passed    Last A1C  Lab Results   Component Value Date    A1C 8.2 06/12/2018    A1C 7.5 11/10/2017    A1C 9.9 08/25/2017    A1C 11.3 05/05/2017    A1C 8.7 12/20/2016     A1C tested: FAILED    Last LDL:    Lab Results   Component Value Date    CHOL 112 08/25/2017     Lab Results   Component Value Date    HDL 43 08/25/2017     Lab Results   Component Value Date    LDL 41 08/25/2017     Lab Results   Component Value Date    TRIG 140 08/25/2017     Lab Results   Component Value Date    CHOLHDLRATIO 4.4 09/25/2015     Lab Results   Component Value Date    NHDL 69 08/25/2017       Is the patient on a Statin? YES             Is the patient on Aspirin? YES    Medications     HMG CoA Reductase Inhibitors    rosuvastatin (CRESTOR) 40 MG tablet    rosuvastatin (CRESTOR) 40 MG tablet    Salicylates    aspirin 81 MG tablet          Last three blood pressure readings:  BP Readings from Last 3 Encounters:   04/24/18 126/76   11/10/17 124/74   10/13/17 142/82       Date of last diabetes office visit: 12/2018     Tobacco History:     History   Smoking Status     Current Some Day Smoker     Types: Cigars   Smokeless Tobacco     Never Used     Comment: cigar once per month           Composite cancer screening  Chart review shows that this patient is due/due soon for the following None  Summary:    Patient is due/failing the following:   Fasten labs and A1C    Action needed:   Patient needs office visit for Follow-up Diabetes    Type of outreach:    Sent [employer] message.    Questions for provider review:    None                                                                                                                                    Pati NAJERA, THU,AAMA       Chart routed to Care Team .          
Spoke with Pt. He's aware of his labs, however, he moved to [place] with his wife, but still [place]. Still requesting refill from . Pt stated they are looking for providers in the area.  Pati NAJERA CMA,MARY    
Statement Selected

## 2024-10-01 NOTE — ASU PATIENT PROFILE, ADULT - FALL HARM RISK - UNIVERSAL INTERVENTIONS
Bed in lowest position, wheels locked, appropriate side rails in place/Call bell, personal items and telephone in reach/Instruct patient to call for assistance before getting out of bed or chair/Non-slip footwear when patient is out of bed/Vendor to call system/Physically safe environment - no spills, clutter or unnecessary equipment/Purposeful Proactive Rounding/Room/bathroom lighting operational, light cord in reach

## 2024-10-01 NOTE — ASU DISCHARGE PLAN (ADULT/PEDIATRIC) - NO EXERCISE DURATION
LORIE PEREZ   metoprolol succinate (TOPROL XL) 25 MG extended release tablet TAKE ONE TABLET BY MOUTH DAILY Yes Chanell Zamarripa PA-C   allopurinol (ZYLOPRIM) 300 MG tablet Take 1 tablet by mouth daily Yes Chanell Zamarripa PA-C   blood glucose test strips (ASCENSIA AUTODISC VI;ONE TOUCH ULTRA TEST VI) strip Use with associated glucose meter.  True metric brand to work with current meter Yes Chanell Kuhn PA-C   Easy Touch Lancets 30G/Twist MISC TEST DAILY Yes Marta Villatoro APRN - CNP   Multiple Vitamins-Minerals (PRESERVISION AREDS) TABS Take 2 tablets by mouth daily Yes Manuel Hanna MD   pravastatin (PRAVACHOL) 20 MG tablet Take 1 tablet by mouth every evening Yes Chanell Zamarripa PA-C   glipiZIDE (GLUCOTROL) 5 MG tablet TAKE ONE TABLET BY MOUTH TWICE A DAY WITH MEALS  Patient taking differently: Take 1 tablet by mouth daily Yes Sumaya Morocho APRN - CNP   warfarin (COUMADIN) 5 MG tablet TAKE ONE TABLET BY MOUTH DAILY EXCEPT ON WEDNESDAY TAKE TAKE 1 AND 1/2 TABLET BY MOUTH DAILY Yes Chanell Zamarripa PA-C   triamcinolone (KENALOG) 0.1 % cream  Yes ProviderYaya MD   amLODIPine (NORVASC) 5 MG tablet  Yes ProviderYaya MD   vitamin E 400 UNIT capsule Take 1 capsule by mouth daily Yes ProviderYaya MD   Omega-3 Fatty Acids (FISH OIL) 1000 MG CAPS Take 3 capsules by mouth daily Yes ProviderYaya MD   Ascorbic Acid (VITAMIN C) 500 MG CAPS Take 500 mg by mouth every other day Yes ProviderYaya MD   Cholecalciferol (VITAMIN D-3) 1000 units CAPS Take 1 capsule by mouth Daily Yes Manuel Hanna MD       [facility] (Including outside providers/suppliers regularly involved in providing care):   Patient Care Team:  Roxana Juarez as PCP - General (Family Medicine)  Roxana Juarez as PCP - Empaneled Provider  Alexis Rios MD as Consulting Physician (Gastroenterology)     Reviewed and updated this visit:  Tobacco  Allergies  Meds  Problems  Med until cleared

## 2024-10-01 NOTE — BRIEF OPERATIVE NOTE - NSICDXBRIEFPROCEDURE_GEN_ALL_CORE_FT
PROCEDURES:  Open repair of inguinal hernia using mesh in adult 01-Oct-2024 13:36:33 left Jenelle Gandara

## 2024-10-01 NOTE — ASU PREOP CHECKLIST - BSA (M2)
Prior to immunization administration, verified patients identity using patient s name and date of birth. Please see Immunization Activity for additional information.     Screening Questionnaire for Adult Immunization    Are you sick today?   No   Do you have allergies to medications, food, a vaccine component or latex?   No   Have you ever had a serious reaction after receiving a vaccination?   No   Do you have a long-term health problem with heart, lung, kidney, or metabolic disease (e.g., diabetes), asthma, a blood disorder, no spleen, complement component deficiency, a cochlear implant, or a spinal fluid leak?  Are you on long-term aspirin therapy?   No   Do you have cancer, leukemia, HIV/AIDS, or any other immune system problem?   No   Do you have a parent, brother, or sister with an immune system problem?   No   In the past 3 months, have you taken medications that affect  your immune system, such as prednisone, other steroids, or anticancer drugs; drugs for the treatment of rheumatoid arthritis, Crohn s disease, or psoriasis; or have you had radiation treatments?   No   Have you had a seizure, or a brain or other nervous system problem?   No   During the past year, have you received a transfusion of blood or blood    products, or been given immune (gamma) globulin or antiviral drug?   No   For women: Are you pregnant or is there a chance you could become       pregnant during the next month?   No   Have you received any vaccinations in the past 4 weeks?   No     Immunization questionnaire answers were all negative.        Per orders of Dr. Mobley, injection of TDAP  given by Jeanette Hankins MA. Patient instructed to remain in clinic for 15 minutes afterwards, and to report any adverse reaction to me immediately.       Screening performed by Jeanette Hankins MA on 5/12/2022 at 6:18 PM.   1.65

## 2024-10-01 NOTE — ASU DISCHARGE PLAN (ADULT/PEDIATRIC) - CARE PROVIDER_API CALL
Stephen Salvador  Surgery  [address] [facility]  [place] [address]  Phone: [phone]  Fax: [phone]  Follow Up Time: 2 weeks

## 2024-10-03 PROBLEM — Z87.438 PERSONAL HISTORY OF OTHER DISEASES OF MALE GENITAL ORGANS: Chronic | Status: ACTIVE | Noted: 2024-09-23

## 2024-10-14 ENCOUNTER — APPOINTMENT (OUTPATIENT)
Dept: SURGERY | Facility: CLINIC | Age: 74
End: 2024-10-14
Payer: MEDICAID

## 2024-10-14 DIAGNOSIS — K40.90 UNILATERAL INGUINAL HERNIA, W/OUT OBSTRUCTION OR GANGRENE, NOT SPECIFIED AS RECURRENT: ICD-10-CM

## 2024-10-14 PROCEDURE — 99024 POSTOP FOLLOW-UP VISIT: CPT

## 2024-11-25 ENCOUNTER — APPOINTMENT (OUTPATIENT)
Dept: SURGERY | Facility: CLINIC | Age: 74
End: 2024-11-25
Payer: MEDICAID

## 2024-11-25 VITALS
OXYGEN SATURATION: 98 % | BODY MASS INDEX: 24.27 KG/M2 | WEIGHT: 137 LBS | SYSTOLIC BLOOD PRESSURE: 123 MMHG | DIASTOLIC BLOOD PRESSURE: 77 MMHG | HEIGHT: 63 IN | HEART RATE: 66 BPM

## 2024-11-25 DIAGNOSIS — K40.90 UNILATERAL INGUINAL HERNIA, W/OUT OBSTRUCTION OR GANGRENE, NOT SPECIFIED AS RECURRENT: ICD-10-CM

## 2024-11-25 PROCEDURE — 99024 POSTOP FOLLOW-UP VISIT: CPT

## 2025-01-06 ENCOUNTER — NON-APPOINTMENT (OUTPATIENT)
Age: 75
End: 2025-01-06

## 2025-01-06 ENCOUNTER — APPOINTMENT (OUTPATIENT)
Dept: SURGERY | Facility: CLINIC | Age: 75
End: 2025-01-06
Payer: MEDICAID

## 2025-01-06 VITALS
BODY MASS INDEX: 24.45 KG/M2 | TEMPERATURE: 97.7 F | DIASTOLIC BLOOD PRESSURE: 74 MMHG | HEIGHT: 63 IN | SYSTOLIC BLOOD PRESSURE: 132 MMHG | WEIGHT: 138 LBS | HEART RATE: 67 BPM | OXYGEN SATURATION: 97 %

## 2025-01-06 DIAGNOSIS — K40.90 UNILATERAL INGUINAL HERNIA, W/OUT OBSTRUCTION OR GANGRENE, NOT SPECIFIED AS RECURRENT: ICD-10-CM

## 2025-01-06 PROCEDURE — 99213 OFFICE O/P EST LOW 20 MIN: CPT

## 2025-01-13 ENCOUNTER — APPOINTMENT (OUTPATIENT)
Dept: SURGERY | Facility: CLINIC | Age: 75
End: 2025-01-13

## 2025-03-31 ENCOUNTER — APPOINTMENT (OUTPATIENT)
Dept: SURGERY | Facility: CLINIC | Age: 75
End: 2025-03-31
Payer: MEDICAID

## 2025-03-31 VITALS
HEIGHT: 63 IN | WEIGHT: 141 LBS | DIASTOLIC BLOOD PRESSURE: 73 MMHG | BODY MASS INDEX: 24.98 KG/M2 | SYSTOLIC BLOOD PRESSURE: 125 MMHG | HEART RATE: 53 BPM | OXYGEN SATURATION: 98 %

## 2025-03-31 DIAGNOSIS — R10.31 RIGHT LOWER QUADRANT PAIN: ICD-10-CM

## 2025-03-31 DIAGNOSIS — K40.90 UNILATERAL INGUINAL HERNIA, W/OUT OBSTRUCTION OR GANGRENE, NOT SPECIFIED AS RECURRENT: ICD-10-CM

## 2025-03-31 PROCEDURE — 99213 OFFICE O/P EST LOW 20 MIN: CPT

## 2025-03-31 RX ORDER — IBUPROFEN 600 MG/1
600 TABLET, FILM COATED ORAL EVERY 6 HOURS
Qty: 60 | Refills: 0 | Status: ACTIVE | COMMUNITY
Start: 2025-03-31 | End: 1900-01-01

## (undated) DEVICE — DRAIN PENROSE 5/8" X 18" LATEX

## (undated) DEVICE — VENODYNE/SCD SLEEVE CALF MEDIUM

## (undated) DEVICE — DRSG CURITY GAUZE SPONGE 4 X 4" 12-PLY

## (undated) DEVICE — PREP CHLORAPREP HI-LITE ORANGE 26ML

## (undated) DEVICE — PACK MINOR NO DRAPE

## (undated) DEVICE — SUT POLYSORB 4-0 27" P-12 UNDYED

## (undated) DEVICE — SUT POLYSORB 2-0 30" V-20 UNDYED

## (undated) DEVICE — DRAPE LAPAROTOMY W POUCHES

## (undated) DEVICE — SUT POLYSORB 3-0 30" V-20 UNDYED

## (undated) DEVICE — FOR-ESU VALLEYLAB T7E15009DX: Type: DURABLE MEDICAL EQUIPMENT

## (undated) DEVICE — SOL IRR POUR NS 0.9% 1500ML

## (undated) DEVICE — SUT SURGIPRO 2-0 30" GS-22

## (undated) DEVICE — ELCTR GROUNDING PAD ADULT COVIDIEN

## (undated) DEVICE — GLV 7 PROTEXIS (WHITE)

## (undated) DEVICE — GLV 7.5 PROTEXIS (WHITE)

## (undated) DEVICE — WARMING BLANKET UPPER ADULT

## (undated) DEVICE — DRSG TEGADERM 4X4.75"

## (undated) DEVICE — SUT POLYSORB 2-0 18" TIES UNDYED

## (undated) DEVICE — DRSG MASTISOL

## (undated) DEVICE — DRAPE LIGHT HANDLE COVER (BLUE)

## (undated) DEVICE — NDL HYPO SAFE 25G X 1.5" (ORANGE)